# Patient Record
Sex: FEMALE | Race: WHITE
[De-identification: names, ages, dates, MRNs, and addresses within clinical notes are randomized per-mention and may not be internally consistent; named-entity substitution may affect disease eponyms.]

---

## 2017-02-16 ENCOUNTER — HOSPITAL ENCOUNTER (OUTPATIENT)
Dept: HOSPITAL 80 - FIMAGING | Age: 47
End: 2017-02-16
Attending: INTERNAL MEDICINE
Payer: COMMERCIAL

## 2017-02-16 DIAGNOSIS — R91.8: Primary | ICD-10-CM

## 2017-06-22 ENCOUNTER — HOSPITAL ENCOUNTER (OUTPATIENT)
Dept: HOSPITAL 80 - FIMAGING | Age: 47
End: 2017-06-22
Attending: INTERNAL MEDICINE
Payer: COMMERCIAL

## 2017-06-22 DIAGNOSIS — R91.1: Primary | ICD-10-CM

## 2017-06-22 PROCEDURE — 71260 CT THORAX DX C+: CPT

## 2017-06-29 ENCOUNTER — HOSPITAL ENCOUNTER (OUTPATIENT)
Dept: HOSPITAL 80 - FIMAGING | Age: 47
Discharge: HOME | End: 2017-06-29
Attending: RADIOLOGY
Payer: COMMERCIAL

## 2017-06-29 VITALS — RESPIRATION RATE: 13 BRPM | DIASTOLIC BLOOD PRESSURE: 87 MMHG | SYSTOLIC BLOOD PRESSURE: 112 MMHG

## 2017-06-29 VITALS — OXYGEN SATURATION: 95 % | HEART RATE: 62 BPM

## 2017-06-29 DIAGNOSIS — R91.8: Primary | ICD-10-CM

## 2017-06-29 LAB
APTT BLD: 28 SEC (ref 23–38)
HCT VFR BLD CALC: 42.9 % (ref 38–47)
INR PPP: 0.92 (ref 0.83–1.16)
PLATELET # BLD: 307 10^3/UL (ref 150–400)
PROTHROMBIN TIME: 12.3 SEC (ref 12–15)

## 2017-06-29 PROCEDURE — 0BBF3ZX EXCISION OF RIGHT LOWER LUNG LOBE, PERCUTANEOUS APPROACH, DIAGNOSTIC: ICD-10-PCS | Performed by: RADIOLOGY

## 2017-06-29 PROCEDURE — BB271ZZ COMPUTERIZED TOMOGRAPHY (CT SCAN) OF RIGHT TRACHEOBRONCHIAL TREE USING LOW OSMOLAR CONTRAST: ICD-10-PCS | Performed by: RADIOLOGY

## 2017-06-29 PROCEDURE — 0B963ZX DRAINAGE OF RIGHT LOWER LOBE BRONCHUS, PERCUTANEOUS APPROACH, DIAGNOSTIC: ICD-10-PCS | Performed by: RADIOLOGY

## 2017-12-15 ENCOUNTER — HOSPITAL ENCOUNTER (OUTPATIENT)
Dept: HOSPITAL 80 - FIMAGING | Age: 47
End: 2017-12-15
Attending: INTERNAL MEDICINE
Payer: COMMERCIAL

## 2017-12-15 DIAGNOSIS — R06.00: ICD-10-CM

## 2017-12-15 DIAGNOSIS — R91.1: Primary | ICD-10-CM

## 2017-12-19 ENCOUNTER — HOSPITAL ENCOUNTER (OUTPATIENT)
Dept: HOSPITAL 80 - FIMAGING | Age: 47
End: 2017-12-19
Attending: INTERNAL MEDICINE
Payer: COMMERCIAL

## 2017-12-19 DIAGNOSIS — Z12.31: Primary | ICD-10-CM

## 2017-12-19 PROCEDURE — G0202 SCR MAMMO BI INCL CAD: HCPCS

## 2018-01-19 ENCOUNTER — HOSPITAL ENCOUNTER (OUTPATIENT)
Dept: HOSPITAL 80 - BHFA | Age: 48
End: 2018-01-19
Attending: INTERNAL MEDICINE
Payer: COMMERCIAL

## 2018-01-19 DIAGNOSIS — R06.09: Primary | ICD-10-CM

## 2018-02-08 ENCOUNTER — HOSPITAL ENCOUNTER (OUTPATIENT)
Dept: HOSPITAL 80 - FIMAGING | Age: 48
End: 2018-02-08
Attending: INTERNAL MEDICINE
Payer: COMMERCIAL

## 2018-02-08 DIAGNOSIS — R91.1: Primary | ICD-10-CM

## 2018-02-08 DIAGNOSIS — J90: ICD-10-CM

## 2018-02-08 DIAGNOSIS — J98.4: ICD-10-CM

## 2018-02-08 PROCEDURE — 71275 CT ANGIOGRAPHY CHEST: CPT

## 2018-02-15 ENCOUNTER — HOSPITAL ENCOUNTER (OUTPATIENT)
Dept: HOSPITAL 80 - BHFA | Age: 48
End: 2018-02-15
Attending: INTERNAL MEDICINE
Payer: COMMERCIAL

## 2018-02-15 DIAGNOSIS — R06.00: Primary | ICD-10-CM

## 2018-03-06 ENCOUNTER — HOSPITAL ENCOUNTER (OUTPATIENT)
Dept: HOSPITAL 80 - FCATH | Age: 48
Discharge: HOME | End: 2018-03-06
Attending: INTERNAL MEDICINE
Payer: COMMERCIAL

## 2018-03-06 DIAGNOSIS — E66.3: ICD-10-CM

## 2018-03-06 DIAGNOSIS — E03.9: ICD-10-CM

## 2018-03-06 DIAGNOSIS — F25.8: ICD-10-CM

## 2018-03-06 DIAGNOSIS — I27.20: ICD-10-CM

## 2018-03-06 DIAGNOSIS — I25.10: Primary | ICD-10-CM

## 2018-03-06 DIAGNOSIS — R06.02: ICD-10-CM

## 2018-03-06 LAB
INR PPP: 1.03 (ref 0.83–1.16)
PLATELET # BLD: 360 10^3/UL (ref 150–400)
PROTHROMBIN TIME: 13.7 SEC (ref 12–15)

## 2018-03-06 PROCEDURE — B2151ZZ FLUOROSCOPY OF LEFT HEART USING LOW OSMOLAR CONTRAST: ICD-10-PCS | Performed by: INTERNAL MEDICINE

## 2018-03-06 PROCEDURE — 4A023N8 MEASUREMENT OF CARDIAC SAMPLING AND PRESSURE, BILATERAL, PERCUTANEOUS APPROACH: ICD-10-PCS | Performed by: INTERNAL MEDICINE

## 2018-03-06 PROCEDURE — C1760 CLOSURE DEV, VASC: HCPCS

## 2018-03-06 PROCEDURE — B2111ZZ FLUOROSCOPY OF MULTIPLE CORONARY ARTERIES USING LOW OSMOLAR CONTRAST: ICD-10-PCS | Performed by: INTERNAL MEDICINE

## 2018-03-06 NOTE — CPEKG
Heart Rate: 86

RR Interval: 698

P-R Interval: 180

QRSD Interval: 82

QT Interval: 404

QTC Interval: 484

P Axis: 57

QRS Axis: 49

T Wave Axis: 95

EKG Severity - ABNORMAL ECG -

EKG Impression: SINUS RHYTHM

EKG Impression: NONSPECIFIC T ABNORMALITIES, ANT-LAT LEADS

Electronically Signed By: Aleks Alexis 07-Mar-2018 12:15:57

## 2018-03-06 NOTE — PDPROPOC
Sedation Plan of Care


Sedation Plan of Care: vital signs stable, mental status noted, patient 

educated of risks, benefits, alternatives, patient can tolerate sedation


ASA Classification: ASA 4


Planned drugs: fentanyl, midazolam


Mallampati Score: Class 3


Mallampati Reference Image: 





Patient passed 3-3-2 rule?: No (patient with pulmonary hypertension needs right 

and left heart cath)

## 2018-03-06 NOTE — PDHPUP
History & Physical Update


H&P update statement: 


This history and physical update is based on an assessment of the patient which 

was completed after admission or registration (within 24 hours), but prior to 

the surgery/procedure.





H&P update: H&P reviewed & patient examined (Please note recent echo revealed 

severe and new pulmonary hypertension...needs right and left heart cath with 

shunt run), no change in patient's condition since H&P completed

## 2018-03-06 NOTE — PDDXCAT
Diagnostic Cath Note





- .


Date: 03/06/18


: [facility]


Indication: other (Patient with severe pulmonary hypertension)





- Procedure


Access: right groin


Procedure: left heart catheterization, right heart catheterization





- Materials


Left Heart Cath size: 5F


Left Heart Cath materials: standard multipack (JL4, JR4, pigtail)


Right Heart Cath size: 7F


Right Heart Cath materials: PWP catheter





- Findings-Left Heart Catheterization


LM: 5mm in size. It trifucates in an LAD, ramus and circumflex system.


LAD: The LAD is 3mm in size.


LCX: The LCX is 2mm in size. NILA III flow throughout.


RCA: It is 3mm in size and dominant. It gives rise to the posterior descending 

and PLV branch. NILA III flow.


Ramus: The ramus is 2.5mm in size. There is no flow limiting obstruction. NILA 

III flow.


EDP: The LVEDP is 16mmHg.


LVEF: The EF is slightly below normal at 55%.


Wall motion: There is no segmental wall motion abnormalities identified.





- Findings-Right Heart Catheterization


RA: Pressure: 14/13, mean: 11 mmHg


RV: Pressure: 77/6, end diastolic pressure is 16mmHg


PA: Pressure: 81/34, The original PA saturation was 72.1%, with drug therapy it 

increased to 84.8%. There is a left to right shunt.


PAOP: 12mmHg.


AO: 96.4%


CO: 5.6 L/min


CI: 2.7 L/min/m2


Complications: None. 


Estimated blood loss: <50ml


Closure method: Angioseal


Assessment: The patient has a right dominant coronary system that is free of 

flow limiting coronary disease.  The patient has severe pulmonary hypertension, 

which was unresponsive to a ramped dose Adenosine challenge. That is her mean 

pulmonary pressure went up from a mean PA pressure of 47mmHg to 50, 51,and then 

55mmHg with increasing doses of Adenosine. PA systolic went down slightly from 

the low 80's to high 70's. A definitive cause for the Pulmonary hypertension is 

not identified on the basis of this study.  I reviewed the patient's CT 

pulmonary angio, which was negative for pulmonary embolus but positive for air 

trapping, focal mass that was previously biopsied, and a ground glass 

appearance to the pulmonary tissue.


Plan: 


Consider NITESH with bubble study to evaluate the intraatrial septum and to rule 

out a large ASD. Consider Cardiac MRI to evaluate the size of intraatrial 

communication and to rule out anomalous pulmonary venous return. Plan for 

pulmonary hypertension expert consultation and review of cath data. 


Intervention: 


None.


Patient Problems: 


 Problems











Problem Status Onset


 


Pulmonary hypertension Acute

## 2018-03-15 ENCOUNTER — HOSPITAL ENCOUNTER (OUTPATIENT)
Dept: HOSPITAL 80 - FCATH | Age: 48
Discharge: HOME | End: 2018-03-15
Attending: INTERNAL MEDICINE
Payer: COMMERCIAL

## 2018-03-15 DIAGNOSIS — F25.8: ICD-10-CM

## 2018-03-15 DIAGNOSIS — E66.3: ICD-10-CM

## 2018-03-15 DIAGNOSIS — E03.9: ICD-10-CM

## 2018-03-15 DIAGNOSIS — I27.20: Primary | ICD-10-CM

## 2018-03-15 PROCEDURE — B246ZZ4 ULTRASONOGRAPHY OF RIGHT AND LEFT HEART, TRANSESOPHAGEAL: ICD-10-PCS | Performed by: INTERNAL MEDICINE

## 2018-03-15 NOTE — PDPROPOC
Sedation Plan of Care


Sedation Plan of Care: vital signs stable, mental status noted, patient 

educated of risks, benefits, alternatives, patient can tolerate sedation


ASA Classification: ASA 3


Planned drugs: fentanyl, midazolam


Mallampati Score: Class 3


Mallampati Reference Image: 





Patient passed 3-3-2 rule?: Yes

## 2018-04-17 ENCOUNTER — HOSPITAL ENCOUNTER (OUTPATIENT)
Dept: HOSPITAL 80 - FIMAGING | Age: 48
End: 2018-04-17
Attending: INTERNAL MEDICINE
Payer: COMMERCIAL

## 2018-04-17 DIAGNOSIS — J98.4: Primary | ICD-10-CM

## 2018-04-17 DIAGNOSIS — R91.1: ICD-10-CM

## 2018-04-17 DIAGNOSIS — I27.29: ICD-10-CM

## 2018-05-03 ENCOUNTER — HOSPITAL ENCOUNTER (OUTPATIENT)
Dept: HOSPITAL 80 - FIMAGING | Age: 48
Discharge: HOME | End: 2018-05-03
Attending: INTERNAL MEDICINE
Payer: COMMERCIAL

## 2018-05-03 VITALS — DIASTOLIC BLOOD PRESSURE: 81 MMHG | SYSTOLIC BLOOD PRESSURE: 109 MMHG

## 2018-05-03 DIAGNOSIS — R06.02: ICD-10-CM

## 2018-05-03 DIAGNOSIS — I27.20: ICD-10-CM

## 2018-05-03 DIAGNOSIS — M35.9: ICD-10-CM

## 2018-05-03 DIAGNOSIS — R09.02: ICD-10-CM

## 2018-05-03 DIAGNOSIS — Q21.1: ICD-10-CM

## 2018-05-03 DIAGNOSIS — J84.10: Primary | ICD-10-CM

## 2018-05-03 LAB
INR PPP: 0.99 (ref 0.83–1.16)
PLATELET # BLD: 327 10^3/UL (ref 150–400)
PROTHROMBIN TIME: 13.3 SEC (ref 12–15)

## 2018-05-03 PROCEDURE — 0BBF3ZX EXCISION OF RIGHT LOWER LUNG LOBE, PERCUTANEOUS APPROACH, DIAGNOSTIC: ICD-10-PCS | Performed by: RADIOLOGY

## 2018-05-03 NOTE — PDPROPOC
Sedation Plan of Care


Sedation Plan of Care: vital signs stable, mental status noted, patient 

educated of risks, benefits, alternatives, patient can tolerate sedation


ASA Classification: ASA 1


Planned drugs: fentanyl, midazolam


Mallampati Score: Class 2


Mallampati Reference Image: 





Patient passed 3-3-2 rule?: Yes

## 2018-05-04 ENCOUNTER — HOSPITAL ENCOUNTER (EMERGENCY)
Dept: HOSPITAL 80 - FED | Age: 48
Discharge: HOME | End: 2018-05-04
Payer: COMMERCIAL

## 2018-05-04 VITALS — DIASTOLIC BLOOD PRESSURE: 52 MMHG | SYSTOLIC BLOOD PRESSURE: 102 MMHG

## 2018-05-04 DIAGNOSIS — R07.89: ICD-10-CM

## 2018-05-04 DIAGNOSIS — G89.18: Primary | ICD-10-CM

## 2018-05-04 NOTE — EDPHY
HPI/HX/ROS/PE/MDM


Narrative: 





CHIEF COMPLAINT:  Chest wall pain





HPI: The patient is a 48-year-old female who underwent a biopsy of a lung mass 

yesterday.  She called in to the Radiology Department to complain of pain at 

the biopsy site and was requesting pain medicine, and was referred to the ER 

for a chest x-ray.  She denies fever, fall or trauma.  She states the pain 

occurred after coughing episode.





REVIEW OF SYSTEMS:


Aside from elements discussed in the HPI, a comprehensive 10-point review of 

systems was reviewed and is negative.





PMH:  Includes mental health issues, pulmonary hypertension, history of lung 

mass





SOCIAL HISTORY:  Denies alcohol or drug abuse.





PHYSICAL EXAM:


General:Patient is alert, in no acute distress.


ENT:Eyes are normal to inspection.  ENT inspection normal.


Neck: Normal inspection.  Full range of motion.


Respiratory:No respiratory distress.  Breath sounds normal bilaterally.  Lung 

biopsy site located on her right posterior chest peers clean dry and intact 

with no signs of infection.  There is some mild tenderness around this area.


Extremities: Normal appearance.  Full range of motion.


Neuro: Oriented x3.  Normal motor function.  Normal sensory function.


MDM: 





This patient presents with chest wall pain at the site of her recent lung 

biopsy.  We performed a chest x-ray at the request of the radiology department 

and this was negative for pneumothorax.  The patient's primary concern appears 

to be obtaining pain medication.  I explained to her that I cannot write her a 

prescription for further narcotics but would be happy to give her a dose here.  

She will follow up with her doctor for further testing.  I considered PE, but 

the patient recently underwent a full CT scan of her lungs and is having pain 

only at the site were along needle was inserted into her chest.  A D-dimer 

would obviously be positive, so I do not think this will as significantly to 

her care.  The patient was noted to be mildly hypoxic.  We spoke to the nurse 

that took care of her yesterday and he stated this was present at that time as 

well.  The patient declined further workup.





- Data Points


Imaging Results: 


 Imaging Impressions





Chest X-Ray  05/04/18 11:26


Impression: No obvious post biopsy abnormality identified considering decreased 

inspiratory phase.


 














General


Time Seen by Provider: 05/04/18 11:31


Initial Vital Signs: 


 Initial Vital Signs











Temperature (C)  36.6 C   05/04/18 10:58


 


Heart Rate  112 H  05/04/18 10:58


 


Respiratory Rate  20   05/04/18 10:58


 


Blood Pressure  113/68   05/04/18 10:58


 


O2 Sat (%)  86 L  05/04/18 10:58








 











O2 Delivery Mode               Room Air


 


O2 (L/minute)                  3














Allergies/Adverse Reactions: 


 





No Known Allergies Allergy (Verified 05/04/18 10:58)


 








Home Medications: 














 Medication  Instructions  Recorded


 


Escitalopram Oxalate [Lexapro] 20 mg PO HS 06/28/17


 


LORazepam [Ativan (*)] 3 mg PO TID PRN 06/28/17


 


Topiramate [Topamax 100MG (*)] 200 mg PO HS 06/28/17


 


Ziprasidone HCl [Geodon] 120 mg PO HS 06/28/17


 


cloZAPine [Clozaril (*)] 500 mg PO HS 06/28/17


 


metFORMIN HCL [Glucophage 1000 mg] 2,000 mg PO HS 06/28/17


 


Docusate Sodium [Colace 100 MG (*)] 100 mg PO HS 02/27/18


 


Ferrous Sulfate [Ferrous Sulf 325 325 mg PO HS 02/27/18





MG (*)]  


 


Herbals/Supplements -Info Only 1 ea PO DAILY 02/27/18


 


Norethindrone-Ethinyl Estrad 1 each PO HS 02/27/18





[Necon 7-7-7-28 Tablet]  














Departure





- Departure


Disposition: Home, Routine, Self-Care


Clinical Impression: 


 Chest wall pain





Condition: Good


Instructions:  Chest Wall Pain (ED)


Additional Instructions: 


Follow-up with your primary doctor within 72 hours.  Return to the Emergency 

Department for fever, chest pain, shortness of breath, increasing pain or other 

worsening of condition.  


Referrals: 


Rima Pimentel MD [Primary Care Provider] - As per Instructions

## 2018-05-07 ENCOUNTER — HOSPITAL ENCOUNTER (OUTPATIENT)
Dept: HOSPITAL 80 - FIMAGING | Age: 48
End: 2018-05-07
Attending: INTERNAL MEDICINE
Payer: COMMERCIAL

## 2018-05-07 DIAGNOSIS — J90: Primary | ICD-10-CM
